# Patient Record
Sex: MALE | Race: WHITE | URBAN - NONMETROPOLITAN AREA
[De-identification: names, ages, dates, MRNs, and addresses within clinical notes are randomized per-mention and may not be internally consistent; named-entity substitution may affect disease eponyms.]

---

## 2019-12-03 ENCOUNTER — HISTORICAL (OUTPATIENT)
Dept: ADMINISTRATIVE | Facility: HOSPITAL | Age: 44
End: 2019-12-03

## 2020-06-10 ENCOUNTER — HISTORICAL (OUTPATIENT)
Dept: ADMINISTRATIVE | Facility: HOSPITAL | Age: 45
End: 2020-06-10

## 2021-07-22 LAB
ALBUMIN SERPL-MCNC: 4.3 G/DL (ref 3.4–5)
ALBUMIN/GLOB SERPL: 1.5 {RATIO}
ALP SERPL-CCNC: 79 U/L (ref 50–144)
ALT SERPL-CCNC: 17 U/L (ref 1–45)
ANION GAP SERPL CALC-SCNC: 4 MMOL/L (ref 7–16)
AST SERPL-CCNC: 36 U/L (ref 17–59)
BASOPHILS # BLD AUTO: 0.11 X10(3)/MCL (ref 0.01–0.08)
BASOPHILS NFR BLD AUTO: 1.8 % (ref 0.1–1.2)
BILIRUB SERPL-MCNC: 0.76 MG/DL (ref 0.1–1)
BUN SERPL-MCNC: 9 MG/DL (ref 7–20)
CALCIUM SERPL-MCNC: 9.4 MG/DL (ref 8.4–10.2)
CHLORIDE SERPL-SCNC: 103 MMOL/L (ref 94–110)
CHOLEST SERPL-MCNC: 206 MG/DL (ref 0–200)
CO2 SERPL-SCNC: 32 MMOL/L (ref 21–32)
CREAT SERPL-MCNC: 0.9 MG/DL (ref 0.66–1.25)
CREAT/UREA NIT SERPL: 10 (ref 12–20)
EOSINOPHIL # BLD AUTO: 0.21 X10(3)/MCL (ref 0.04–0.54)
EOSINOPHIL NFR BLD AUTO: 3.4 % (ref 0.7–7)
ERYTHROCYTE [DISTWIDTH] IN BLOOD BY AUTOMATED COUNT: 12.9 % (ref 11.6–14.4)
EST CREAT CLEARANCE SER (OHS): 91.78 ML/MIN
GLOBULIN SER-MCNC: 2.9 G/DL (ref 2–3.9)
GLUCOSE SERPL-MCNC: 88 MGM./DL (ref 70–115)
HCT VFR BLD AUTO: 45.2 % (ref 36–52)
HDLC SERPL-MCNC: 66 MG/DL (ref 40–60)
HGB BLD-MCNC: 15.1 G/DL (ref 13–18)
IMM GRANULOCYTES # BLD AUTO: 0.02 X10E3/UL (ref 0–0.03)
IMM GRANULOCYTES NFR BLD AUTO: 0.3 % (ref 0–0.5)
LDLC SERPL CALC-MCNC: 86.7 MG/DL (ref 30–100)
LYMPHOCYTES # BLD AUTO: 2.08 X10(3)/MCL (ref 1.32–3.57)
LYMPHOCYTES NFR BLD AUTO: 33.4 % (ref 20–55)
MCH RBC QN AUTO: 31.1 PG (ref 27–34)
MCHC RBC AUTO-ENTMCNC: 33.4 G/DL (ref 31–37)
MCV RBC AUTO: 93 FL (ref 79–99)
MONOCYTES # BLD AUTO: 0.72 X10(3)/MCL (ref 0.3–0.82)
MONOCYTES NFR BLD AUTO: 11.6 % (ref 4.7–12.5)
NEUTROPHILS # BLD AUTO: 3.08 X10(3)/MCL (ref 1.78–5.38)
NEUTROPHILS NFR BLD AUTO: 49.5 % (ref 37–73)
PLATELET # BLD AUTO: 247 X10(3)/MCL (ref 140–371)
PMV BLD AUTO: 10.4 FL (ref 9.4–12.4)
POTASSIUM SERPL-SCNC: 4.1 MMOL/L (ref 3.5–5.1)
PROT SERPL-MCNC: 7.2 G/DL (ref 6.3–8.2)
RBC # BLD AUTO: 4.86 X10(6)/MCL (ref 4–6)
SODIUM SERPL-SCNC: 139 MMOL/L (ref 135–145)
TRIGL SERPL-MCNC: 166 MG/DL (ref 30–200)
TSH SERPL-ACNC: 3.41 UIU/ML (ref 0.36–3.74)
WBC # SPEC AUTO: 6.2 X10(3)/MCL (ref 4–11.5)

## 2022-04-10 ENCOUNTER — HISTORICAL (OUTPATIENT)
Dept: ADMINISTRATIVE | Facility: HOSPITAL | Age: 47
End: 2022-04-10

## 2022-04-27 VITALS
DIASTOLIC BLOOD PRESSURE: 68 MMHG | SYSTOLIC BLOOD PRESSURE: 120 MMHG | BODY MASS INDEX: 19.1 KG/M2 | HEIGHT: 72 IN | WEIGHT: 141 LBS

## 2022-05-01 ENCOUNTER — HISTORICAL (OUTPATIENT)
Dept: ADMINISTRATIVE | Facility: HOSPITAL | Age: 47
End: 2022-05-01

## 2022-05-03 NOTE — HISTORICAL [facility] CERNER
This is a historical note converted from [employer]. Formatting and pictures may have been removed.  Please reference [employer] for original formatting and attached multimedia. Chief Complaint  fasting labs. chest pains to left side. states that he has a pinched nerve to left arm. hx of copd.  History of Present Illness  44 yo white male male presents to the clinic in follow-up.? Reports he has a history of left arm pain and numbness on and off for 6-7 years.? Also with c/o chest?wall discomfort/numbness.? Was told it was a pinched nerve, but does not desire to have anything done about it.??? Was on Anoro, but ran out of medication and found that he has increased COPD symptoms.? Smokes 1/2-1 ppd and has been cutting down.? Reports he is having difficulty with staying focused at work.? he works as a  at the [employer] and has trouble with?staying on task with orders.? Reports he eats one meal per day, drinks mostly coke and very little water.? Is fasting today and agrees to have lab work done - is?self pay  Review of Systems  Constitutional:?no fever, fatigue, weakness  Eye:?no vision loss, eye redness, drainage, or pain  ENMT:?no sore throat, ear pain, sinus pain/congestion, nasal congestion/drainage  Respiratory:?no cough, no wheezing, no shortness of breath, + smoker with COPD  Cardiovascular:?no chest pain, no palpitations, no edema  Gastrointestinal:?no nausea, vomiting, or diarrhea. No abdominal pain  Genitourinary:?no dysuria, no urinary frequency or urgency, no hematuria  Hema/Lymph:?no abnormal bruising or bleeding  Endocrine:?no heat or cold intolerance, no excessive thirst or excessive urination  Musculoskeletal:?no muscle or joint pain, no joint swelling  Integumentary:?no skin rash or abnormal lesion  Neurologic: no headache, no dizziness, + numbness left arm for approx 7 years  Psych:?+ c/o attention issues  ?  Physical Exam  Vitals & Measurements  T:?36.5? ?C (Temporal Artery)? HR:?71(Peripheral)?  RR:?20? BP:?130/78?  HT:?182.88?cm? WT:?63.950?kg? BMI:?19.12?  ?  Constitutional  General Appearance:?too thin, well developed  Level of Distress:?NAD  Ambulation:?ambulating normally  ?  Psychiatric  Insight:?good judgement  Mental Status: active and alert,?normal affect,?not depressed,?not anxious  Orientation: to time, to place, to person  ?   Eyes  Lids and Conjunctivae:?non-injected,?no discharge  Pupils: PERRLA  EOM:?EOMI  Sclerae:?non-icteric  ?  ENMT  Ears: no lesions on external ear,?EACs clear,?TMs clear w good landmarks  Hearing:?Conversational Hearing Normal  Nose: no lesions on external nose  Lips, Teeth, and Gums: no mouth or lip ulcers  Oropharynx:?moist mucous membranes  ?  Neck  Neck: supple  Lymph Nodes:?no cervical LAD, no supraclavicular LAD  Thyroid:?non-tender,?no nodules  ?  Lungs  Respiratory effort:?no dyspnea  Auscultation:?breath sounds normal,?good air movement,?no wheezing,?no rales/crackles,?no rhonchi  ?  Cardiovascular  Heart Auscultation:?RRR, normal S1, normal S2,?no murmurs  ?  Abdomen  Bowel Sounds:?normal  Inspection and Palpation:?soft,?non-distended,?no tenderness  ?  Musculoskeletal:  Joints, Bones, and Muscles:?normal movement of all extremities  Extremities:no cyanosis,no edema  ?  Neurologic  Gait and Station:?normal gait, normal station  Cranial Nerves: grossly intact  ?   Skin  Inspection and palpation:?no rash,good turgor  Assessment/Plan  1.?COPD without exacerbation ? J44.9  ?out of Anoro, will try Trelegy -? 2 wk sample given  2.?Tobacco user ? Z72.0  ?1/2-1 ppd  3.?Inattention ? R41.840  ?c/o inattention and focus issues and issues with completing tickets at work,? ADD screening paperwork done - found out pt could not read paper which maybe his issue at work, will discuss further with next visit  Orders:  fluticasone/umeclidinium/vilanterol, = 1 puff(s), INH, Daily, at the same time every day, # 1 inh, 0 Refill(s), Samples: 1, samples given to patient (Rx), Lot  Number [id number], Patient Education Provided, Patient Verbalizes Understanding  follow-up in 2 weeks for lab results and f/u on Trelegy start  Referrals  Clinic Follow up, Order for future visit   Problem List/Past Medical History  Ongoing  Chronic bronchitis with COPD (chronic obstructive pulmonary disease)  COPD without exacerbation  Inattention  Historical  No qualifying data  Medications  Trelegy Ellipta 100 mcg-62.5 mcg-25 mcg/inh inhalation powder, 1 puff(s), INH, Daily  Allergies  codeine?(Nausea)  penicillin?(Nausea)  Social History  Abuse/Neglect  No, No, Yes, 07/22/2021  Alcohol  Current, Beer, Daily, Alcohol use interferes with work or home: No. Others hurt by drinking: No. Household alcohol concerns: No., 07/07/2020  Employment/School  Employed, Work/School description: [employer]. Highest education level: High school., 07/07/2020  Substance Use  Never, 07/07/2020  Tobacco  10 or more cigarettes (1/2 pack or more)/day in last 30 days, Yes, 14 Years (Age started)., 07/22/2021  Family History  Heart disease: Mother.  Hypercholesterolemia: Mother.  Hyperlipidemia.: Mother.  Hypertension.: Mother.  Health Maintenance  Health Maintenance  ???Pending?(in the next year)  ??? ??OverDue  ??? ? ? ?Alcohol Misuse Screening due??01/02/21??and every 1??year(s)  ??? ??Due?  ??? ? ? ?ADL Screening due??07/22/21??and every 1??year(s)  ??? ? ? ?Aspirin Therapy for CVD Prevention due??07/22/21??and every 1??year(s)  ??? ? ? ?COPD Maintenance-Spirometry due??07/22/21??Unknown Frequency  ??? ? ? ?Lipid Screening due??07/22/21??Unknown Frequency  ??? ? ? ?Tetanus Vaccine due??07/22/21??and every 10??year(s)  ??? ??Due In Future?  ??? ? ? ?Obesity Screening not due until??01/01/22??and every 1??year(s)  ??? ? ? ?Smoking Cessation not due until??01/01/22??and every 1??year(s)  ???Satisfied?(in the past 1 year)  ??? ??Satisfied?  ??? ? ? ?Blood Pressure Screening on??07/22/21.??Satisfied by Crys Mckoy  ??? ? ? ?Body  Mass Index Check on??07/22/21.??Satisfied by Crys Mckoy  ??? ? ? ?Depression Screening on??07/22/21.??Satisfied by Crys Mckoy  ??? ? ? ?Obesity Screening on??07/22/21.??Satisfied by Crys Mckoy  ??? ? ? ?Smoking Cessation on??07/22/21.??Satisfied by Crys Mckoy  ??? ??Refused?  ??? ? ? ?Influenza Vaccine on??07/22/21.??Recorded by Crys Mckoy??Reason: Patient Refuses  ?